# Patient Record
Sex: FEMALE | Race: WHITE | NOT HISPANIC OR LATINO | Employment: FULL TIME
[De-identification: names, ages, dates, MRNs, and addresses within clinical notes are randomized per-mention and may not be internally consistent; named-entity substitution may affect disease eponyms.]

---

## 2017-07-15 ENCOUNTER — HEALTH MAINTENANCE LETTER (OUTPATIENT)
Age: 22
End: 2017-07-15

## 2018-07-29 ENCOUNTER — HEALTH MAINTENANCE LETTER (OUTPATIENT)
Age: 23
End: 2018-07-29

## 2022-01-08 ENCOUNTER — HEALTH MAINTENANCE LETTER (OUTPATIENT)
Age: 27
End: 2022-01-08

## 2022-02-27 ENCOUNTER — HEALTH MAINTENANCE LETTER (OUTPATIENT)
Age: 27
End: 2022-02-27

## 2022-06-03 ENCOUNTER — MEDICAL CORRESPONDENCE (OUTPATIENT)
Dept: HEALTH INFORMATION MANAGEMENT | Facility: CLINIC | Age: 27
End: 2022-06-03
Payer: COMMERCIAL

## 2022-06-16 ENCOUNTER — HOSPITAL ENCOUNTER (EMERGENCY)
Facility: CLINIC | Age: 27
Discharge: HOME | End: 2022-06-17
Attending: EMERGENCY MEDICINE | Admitting: EMERGENCY MEDICINE
Payer: COMMERCIAL

## 2022-06-16 DIAGNOSIS — M54.42 ACUTE LEFT-SIDED LOW BACK PAIN WITH LEFT-SIDED SCIATICA: ICD-10-CM

## 2022-06-16 PROCEDURE — 99284 EMERGENCY DEPT VISIT MOD MDM: CPT | Mod: 25 | Performed by: EMERGENCY MEDICINE

## 2022-06-16 PROCEDURE — 20552 NJX 1/MLT TRIGGER POINT 1/2: CPT | Mod: LT | Performed by: EMERGENCY MEDICINE

## 2022-06-16 PROCEDURE — 81001 URINALYSIS AUTO W/SCOPE: CPT | Performed by: EMERGENCY MEDICINE

## 2022-06-16 PROCEDURE — 81025 URINE PREGNANCY TEST: CPT | Performed by: EMERGENCY MEDICINE

## 2022-06-16 NOTE — Clinical Note
Telma Sandoval was seen and treated in our emergency department on 6/16/2022.  She may return to work on 06/20/2022.       If you have any questions or concerns, please don't hesitate to call.      Brandie Lucas, RN

## 2022-06-17 VITALS
TEMPERATURE: 98.5 F | RESPIRATION RATE: 18 BRPM | HEART RATE: 98 BPM | WEIGHT: 220 LBS | OXYGEN SATURATION: 99 % | DIASTOLIC BLOOD PRESSURE: 83 MMHG | SYSTOLIC BLOOD PRESSURE: 132 MMHG

## 2022-06-17 LAB
ALBUMIN UR-MCNC: NEGATIVE MG/DL
APPEARANCE UR: CLEAR
BACTERIA #/AREA URNS HPF: ABNORMAL /HPF
BILIRUB UR QL STRIP: NEGATIVE
COLOR UR AUTO: YELLOW
GLUCOSE UR STRIP-MCNC: NEGATIVE MG/DL
HCG UR QL: NEGATIVE
HGB UR QL STRIP: NEGATIVE
KETONES UR STRIP-MCNC: NEGATIVE MG/DL
LEUKOCYTE ESTERASE UR QL STRIP: NEGATIVE
MUCOUS THREADS #/AREA URNS LPF: PRESENT /LPF
NITRATE UR QL: NEGATIVE
PH UR STRIP: 5 [PH] (ref 5–7)
RBC URINE: 1 /HPF
SP GR UR STRIP: 1.02 (ref 1–1.03)
SQUAMOUS EPITHELIAL: 2 /HPF
UROBILINOGEN UR STRIP-MCNC: NORMAL MG/DL
WBC URINE: 1 /HPF

## 2022-06-17 PROCEDURE — 250N000013 HC RX MED GY IP 250 OP 250 PS 637: Performed by: EMERGENCY MEDICINE

## 2022-06-17 PROCEDURE — 250N000011 HC RX IP 250 OP 636: Performed by: EMERGENCY MEDICINE

## 2022-06-17 RX ORDER — OXYCODONE HYDROCHLORIDE 5 MG/1
5 TABLET ORAL EVERY 6 HOURS PRN
Qty: 6 TABLET | Refills: 0 | Status: SHIPPED | OUTPATIENT
Start: 2022-06-17 | End: 2022-09-18

## 2022-06-17 RX ORDER — ONDANSETRON 4 MG/1
4 TABLET, ORALLY DISINTEGRATING ORAL ONCE
Status: COMPLETED | OUTPATIENT
Start: 2022-06-17 | End: 2022-06-17

## 2022-06-17 RX ORDER — OXYCODONE HYDROCHLORIDE 5 MG/1
5 TABLET ORAL EVERY 6 HOURS PRN
Qty: 10 TABLET | Refills: 0 | Status: SHIPPED | OUTPATIENT
Start: 2022-06-17 | End: 2022-06-17

## 2022-06-17 RX ORDER — OXYCODONE HYDROCHLORIDE 5 MG/1
10 TABLET ORAL ONCE
Status: COMPLETED | OUTPATIENT
Start: 2022-06-17 | End: 2022-06-17

## 2022-06-17 RX ADMIN — ONDANSETRON 4 MG: 4 TABLET, ORALLY DISINTEGRATING ORAL at 01:03

## 2022-06-17 RX ADMIN — OXYCODONE HYDROCHLORIDE 10 MG: 5 TABLET ORAL at 01:10

## 2022-06-17 RX ADMIN — IBUPROFEN 600 MG: 400 TABLET ORAL at 01:12

## 2022-06-17 NOTE — DISCHARGE INSTRUCTIONS
Return to the emergency department for fever, weakness in the legs, numbness or tingling of the vagina or anus, difficulty urinating, or other concerns.  Follow-up with your physical therapist and with your primary doctor.    Use ibuprofen and acetaminophen for your symptoms.  Use oxycodone as needed for pain that is not controlled by the prior two medications.    Oxycodone is an addictive opioid medication, please only take it when the pain is more than can be controlled by acetaminophen or ibuprofen alone. It will also make you lightheaded, nauseated, and constipated.  Do not drive, operate heavy machinery, or take care of young children while taking this medication. Do not mix it with other medications or drugs that will make you sleepy, such as alcohol.     Repeated studies have shown that the longer you use opioid pain medications, the longer it is until you return to normal function. It is our recommendation that you taper off opioids as quickly as possible with the goal of returning to normal function or near normal function. Long term use of opioids quickly results in growing tolerance to the medication (less of the benefits) and increased dependence (more of the bad side effects).     Pain is very difficult to treat and we can very rarely take away pain completely. If you are having difficulty with pain over several weeks after an injury, you may need to start different medications and therapies (such as physical therapy, graded exercise, massage, and acupuncture). Please talk about this with your regular doctor.     If you are interested in seeking free, confidential treatment referral and information service for individuals and families facing mental health and/or substance use disorders please call [phone] ([phone])

## 2022-06-17 NOTE — ED TRIAGE NOTES
Pt here with pain in L lower back that radiates to the L leg and started about 2 months ago. Pt see's an Maggie [facility] and is set up for physical therapy but has not started yet. Pt states that she was sitting on the floor tonight and went to get up and the back locked up on her and was on the floor for 2 hours before she could get up tonight.     Triage Assessment     Row Name 06/16/22 1783       Triage Assessment (Adult)    Airway WDL WDL       Respiratory WDL    Respiratory WDL WDL       Skin Circulation/Temperature WDL    Skin Circulation/Temperature WDL WDL       Cardiac WDL    Cardiac WDL WDL       Peripheral/Neurovascular WDL    Peripheral Neurovascular WDL WDL       Cognitive/Neuro/Behavioral WDL    Cognitive/Neuro/Behavioral WDL WDL

## 2022-06-17 NOTE — ED PROVIDER NOTES
History     Chief Complaint   Patient presents with     Back Pain     HPI  Telma Sandoval is a 27 year old female who presents for back pain.  Pain started several months ago but worse today after she was sitting on the ground.  Pain localized to left lower back, with radiation to left thigh.  Described as cramping and tightness, rated as severe.  She does not have a history of trauma.  Has taken acetaminophen and Flexeril for this pain.  She does have a hx of chronic back pain; has a first appointment with physical therapy next week.  She does not have bowel/bladder dysfunction, history of malignancy, history of IVDU, history of immunosuppression, or fever. Denies numbness or tingling of the perineum. She does not have headache, chest pain, shortness of breath, abdominal pain, vomiting, diarrhea, or extremity weakness or paresthesias.      Allergies:  Allergies   Allergen Reactions     Augmentin Nausea and Vomiting and Diarrhea       Problem List:    There are no problems to display for this patient.       Past Medical History:    History reviewed. No pertinent past medical history.    Past Surgical History:    History reviewed. No pertinent surgical history.    Family History:    History reviewed. No pertinent family history.    Social History:  Marital Status:  Single [1]  Social History     Tobacco Use     Smoking status: Never Smoker     Smokeless tobacco: Never Used   Substance Use Topics     Alcohol use: Yes     Comment: Occasional     Drug use: Yes     Types: Marijuana        Medications:    oxyCODONE (ROXICODONE) 5 MG tablet  Cetirizine HCl (ZYRTEC PO)  famotidine (PEPCID) 10 MG tablet          Review of Systems  Pertinent positives and negatives listed in the HPI, all other systems reviewed and are negative.    Physical Exam   BP: (!) 164/108  Pulse: 105  Temp: 98.5  F (36.9  C)  Resp: 18  Weight: 99.8 kg (220 lb)  SpO2: 97 %      Physical Exam  Constitutional:       General: She is not in acute  distress.     Appearance: She is well-developed. She is not diaphoretic.   HENT:      Head: Normocephalic and atraumatic.   Eyes:      General: No scleral icterus.  Musculoskeletal:      Cervical back: Normal range of motion and neck supple.      Comments: Back: no deformity, erythema, warmth, or masses appreciated; no tenderness over midline, R and L paraspinal muscles, sacroiliac region; normal spine ROM; spine symmetric with normal curvature; normal ROM of hips and knees; intact LE sensation to light touch; normal LE strength   Skin:     General: Skin is warm and dry.      Coloration: Skin is not pale.      Findings: No erythema or rash.   Neurological:      Mental Status: She is alert and oriented to person, place, and time.      Comments: Left lower extremity: 5/5 strength with hip flexion, hip extension, knee flexion, knee extension, dorsiflexion, and plantar flexion  Right lower extremity: 5/5 strength with hip flexion, hip extension, knee flexion, knee extension, dorsiflexion, and plantar flexion          ED Course                 Procedures  Trigger point injection  Location: left lower back pain  Indication: pain  Date: 6/17/2022   The patient gave verbal consent for the procedure. Risks and benefits were discussed.  Pre-procedure exam: Motor and sensory exam normal prior to injection. Normal perfusion.  Procedure: A 27 gauge needle was inserted under normal sterile procedure. Approximately 4 mL of 0.5% bupivacaine was injected. The patient tolerated the procedure well without immediate complication. Pain was improved.             Critical Care time:  none               Results for orders placed or performed during the hospital encounter of 06/16/22 (from the past 24 hour(s))   UA reflex to Microscopic   Result Value Ref Range    Color Urine Yellow Colorless, Straw, Light Yellow, Yellow    Appearance Urine Clear Clear    Glucose Urine Negative Negative mg/dL    Bilirubin Urine Negative Negative    Ketones  Urine Negative Negative mg/dL    Specific Gravity Urine 1.020 1.003 - 1.035    Blood Urine Negative Negative    pH Urine 5.0 5.0 - 7.0    Protein Albumin Urine Negative Negative mg/dL    Urobilinogen Urine Normal Normal, 2.0 mg/dL    Nitrite Urine Negative Negative    Leukocyte Esterase Urine Negative Negative    Bacteria Urine Few (A) None Seen /HPF    RBC Urine 1 <=2 /HPF    WBC Urine 1 <=5 /HPF    Squamous Epithelials Urine 2 (H) <=1 /HPF    Mucus Urine Present (A) None Seen /LPF    Narrative    Microscopic not indicated   HCG qualitative urine   Result Value Ref Range    hCG Urine Qualitative Negative Negative       Medications   ibuprofen (ADVIL/MOTRIN) tablet 600 mg (600 mg Oral Given 6/17/22 0112)   oxyCODONE (ROXICODONE) tablet 10 mg (10 mg Oral Given 6/17/22 0110)   ondansetron (ZOFRAN ODT) ODT tab 4 mg (4 mg Oral Given 6/17/22 0103)       Assessments & Plan (with Medical Decision Making)   27 year old female who presents with back pain. Patient vitally stable.  Differential includes muscle strain vs spasm, DJD, disk herniation, spinal stenosis, vertebral fracture.  Pt does not have historical features or exam findings to suggest more serious back pathology such as metastatic disease, tuberculosis, epidural abscess, or cauda equina/conus medullaris.  No indication for imaging at this time.  Urine pregnancy test negative, no signs of ectopic pregnancy.  Trigger point injection performed as above and she is given oxycodone and ibuprofen for the pain.  At this time she is safe to discharge with a short course of oxycodone and instructions to return if she has worsening of her symptoms, fevers, weakness in legs, or other concerns.  Otherwise follow-up in clinic.  The patient is in agreement with this plan.    I have reviewed the nursing notes.    I have reviewed the findings, diagnosis, plan and need for follow up with the patient.       New Prescriptions    OXYCODONE (ROXICODONE) 5 MG TABLET    Take 1 tablet  (5 mg) by mouth every 6 hours as needed for severe pain       Final diagnoses:   Acute left-sided low back pain with left-sided sciatica       6/16/2022   [facility] EMERGENCY DEPT     Arturo Ruth MD  06/17/22 0121

## 2022-06-19 ENCOUNTER — HEALTH MAINTENANCE LETTER (OUTPATIENT)
Age: 27
End: 2022-06-19

## 2022-06-21 ENCOUNTER — HOSPITAL ENCOUNTER (OUTPATIENT)
Dept: PHYSICAL THERAPY | Facility: CLINIC | Age: 27
Setting detail: THERAPIES SERIES
Discharge: HOME | End: 2022-06-21
Payer: COMMERCIAL

## 2022-06-21 PROCEDURE — 97110 THERAPEUTIC EXERCISES: CPT | Mod: GP | Performed by: PHYSICAL MEDICINE & REHABILITATION

## 2022-06-21 PROCEDURE — 97161 PT EVAL LOW COMPLEX 20 MIN: CPT | Mod: GP | Performed by: PHYSICAL MEDICINE & REHABILITATION
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
## 2022-06-28 ENCOUNTER — HOSPITAL ENCOUNTER (OUTPATIENT)
Dept: PHYSICAL THERAPY | Facility: CLINIC | Age: 27
Setting detail: THERAPIES SERIES
Discharge: HOME | End: 2022-06-28
Payer: COMMERCIAL

## 2022-06-28 PROCEDURE — 97110 THERAPEUTIC EXERCISES: CPT | Mod: GP | Performed by: PHYSICAL MEDICINE & REHABILITATION

## 2022-07-07 ENCOUNTER — HOSPITAL ENCOUNTER (OUTPATIENT)
Dept: PHYSICAL THERAPY | Facility: CLINIC | Age: 27
Setting detail: THERAPIES SERIES
Discharge: HOME | End: 2022-07-07
Attending: PSYCHIATRY & NEUROLOGY
Payer: COMMERCIAL

## 2022-07-07 PROCEDURE — 97110 THERAPEUTIC EXERCISES: CPT | Mod: GP | Performed by: PHYSICAL MEDICINE & REHABILITATION

## 2022-07-29 ENCOUNTER — HOSPITAL ENCOUNTER (OUTPATIENT)
Dept: PHYSICAL THERAPY | Facility: CLINIC | Age: 27
Setting detail: THERAPIES SERIES
Discharge: HOME | End: 2022-07-29
Attending: PSYCHIATRY & NEUROLOGY
Payer: COMMERCIAL

## 2022-07-29 PROCEDURE — 97110 THERAPEUTIC EXERCISES: CPT | Mod: GP | Performed by: PHYSICAL MEDICINE & REHABILITATION

## 2022-08-02 ENCOUNTER — HOSPITAL ENCOUNTER (OUTPATIENT)
Dept: PHYSICAL THERAPY | Facility: CLINIC | Age: 27
Setting detail: THERAPIES SERIES
Discharge: HOME | End: 2022-08-02
Attending: PSYCHIATRY & NEUROLOGY
Payer: COMMERCIAL

## 2022-08-02 PROCEDURE — 97012 MECHANICAL TRACTION THERAPY: CPT | Mod: GP | Performed by: PHYSICAL MEDICINE & REHABILITATION

## 2022-08-02 PROCEDURE — 97110 THERAPEUTIC EXERCISES: CPT | Mod: GP | Performed by: PHYSICAL MEDICINE & REHABILITATION

## 2022-08-11 ENCOUNTER — OFFICE VISIT (OUTPATIENT)
Dept: URGENT CARE | Facility: URGENT CARE | Age: 27
End: 2022-08-11
Payer: COMMERCIAL

## 2022-08-11 VITALS
SYSTOLIC BLOOD PRESSURE: 136 MMHG | WEIGHT: 220 LBS | HEART RATE: 91 BPM | DIASTOLIC BLOOD PRESSURE: 86 MMHG | OXYGEN SATURATION: 97 % | TEMPERATURE: 99.3 F

## 2022-08-11 DIAGNOSIS — U07.1 INFECTION DUE TO 2019 NOVEL CORONAVIRUS: Primary | ICD-10-CM

## 2022-08-11 PROCEDURE — 99203 OFFICE O/P NEW LOW 30 MIN: CPT | Mod: CS | Performed by: FAMILY MEDICINE

## 2022-08-11 RX ORDER — ALBUTEROL SULFATE 90 UG/1
AEROSOL, METERED RESPIRATORY (INHALATION)
COMMUNITY
Start: 2022-08-10

## 2022-08-11 RX ORDER — TIZANIDINE HYDROCHLORIDE 2 MG/1
2 CAPSULE, GELATIN COATED ORAL 3 TIMES DAILY
COMMUNITY

## 2022-08-11 RX ORDER — PROPRANOLOL HYDROCHLORIDE 20 MG/1
20 TABLET ORAL 3 TIMES DAILY
COMMUNITY

## 2022-08-11 RX ORDER — LANOLIN ALCOHOL/MO/W.PET/CERES
1000 CREAM (GRAM) TOPICAL DAILY
COMMUNITY

## 2022-08-11 RX ORDER — GABAPENTIN 400 MG/1
200 CAPSULE ORAL 2 TIMES DAILY
COMMUNITY

## 2022-08-11 RX ORDER — CYCLOBENZAPRINE HCL 10 MG
TABLET ORAL
COMMUNITY
Start: 2022-05-06

## 2022-08-11 RX ORDER — BENZONATATE 100 MG/1
100 CAPSULE ORAL 3 TIMES DAILY PRN
Qty: 30 CAPSULE | Refills: 1 | Status: SHIPPED | OUTPATIENT
Start: 2022-08-11

## 2022-08-11 NOTE — PROGRESS NOTES
SUBJECTIVE:  Telma Sandoval is a 27 year old female who presents to the clinic today with a chief complaint of cough  and pleuritic chest pain for 1 day(s).  Her cough is described as occasional.    The patient's symptoms are mild and not changing over the course of time.  Associated symptoms include chest pain. The patient's symptoms are exacerbated by no particular triggers  Patient has been using albuterol nebs  to improve symptoms.    No past medical history on file.    Current Outpatient Medications   Medication Sig Dispense Refill     Cetirizine HCl (ZYRTEC PO) Take  by mouth.       cyanocobalamin (VITAMIN B-12) 1000 MCG tablet Take 1,000 mcg by mouth daily       cyclobenzaprine (FLEXERIL) 10 MG tablet TAKE 1 TABLET BY MOUTH AT BEDTIME AS NEEDED       famotidine (PEPCID) 10 MG tablet Take 10 mg by mouth 2 times daily.       gabapentin (NEURONTIN) 400 MG capsule Take 200 mg by mouth 2 times daily       propranolol (INDERAL) 20 MG tablet Take 20 mg by mouth 3 times daily       sertraline (ZOLOFT) 50 MG tablet Take 75 mg by mouth daily       tiZANidine (ZANAFLEX) 2 MG capsule Take 2 mg by mouth 3 times daily       VENTOLIN  (90 Base) MCG/ACT inhaler        oxyCODONE (ROXICODONE) 5 MG tablet Take 1 tablet (5 mg) by mouth every 6 hours as needed for severe pain (Patient not taking: Reported on 8/11/2022) 6 tablet 0       Social History     Tobacco Use     Smoking status: Never Smoker     Smokeless tobacco: Never Used   Substance Use Topics     Alcohol use: Yes     Comment: Occasional       ROS  Review of systems negative except as stated above.    OBJECTIVE:  /86   Pulse 91   Temp 99.3  F (37.4  C) (Tympanic)   Wt 99.8 kg (220 lb)   SpO2 97%   GENERAL APPEARANCE: healthy, alert and no distress  EYES: EOMI,  PERRL, conjunctiva clear  HENT: ear canals and TM's normal.  Nose and mouth without ulcers, erythema or lesions  NECK: supple, nontender, no lymphadenopathy  RESP: lungs clear to  "auscultation - no rales, rhonchi or wheezes  CV: regular rates and rhythm, normal S1 S2, no murmur noted  SKIN: no suspicious lesions or rashes    ASSESSMENT:    1. Infection due to 2019 novel coronavirus  Improving   - benzonatate (TESSALON) 100 MG capsule; Take 1 capsule (100 mg) by mouth 3 times daily as needed for cough  Dispense: 30 capsule; Refill: 1    PLAN: symptomatic treatment   See orders in Epic  Symptomatic measures encouraged, humidified air, plenty of fluids.        Najma Hollis is a 27 year old, presenting for the following health issues:  Cough (2 days ago, throat pain, cough, fever, positive covid at home test.  Difficulty breathing, used inhaler, didn't help, spoke with \"doctor on demand\" and was told to go to .)      HPI       Bridgett Patterson M.D.                  .  ..      "

## 2022-08-11 NOTE — PATIENT INSTRUCTIONS
Cough may last a few weeks longer than other symptoms. As long as there is improvement over time this is normal as everything heals up from the original virus.

## 2022-08-24 ENCOUNTER — HOSPITAL ENCOUNTER (OUTPATIENT)
Dept: PHYSICAL THERAPY | Facility: CLINIC | Age: 27
Setting detail: THERAPIES SERIES
Discharge: HOME | End: 2022-08-24
Attending: PSYCHIATRY & NEUROLOGY
Payer: COMMERCIAL

## 2022-08-24 PROCEDURE — 97012 MECHANICAL TRACTION THERAPY: CPT | Mod: GP | Performed by: PHYSICAL MEDICINE & REHABILITATION

## 2022-08-24 PROCEDURE — 97110 THERAPEUTIC EXERCISES: CPT | Mod: GP | Performed by: PHYSICAL MEDICINE & REHABILITATION

## 2022-08-26 NOTE — PROGRESS NOTES
[facility]    Outpatient Physical Therapy Progress Note  Patient: Telma Sandoval  : 1995    Beginning/End Dates of Reporting Period:  22 to 22    Referring Provider: Jessie Flores MD    Therapy Diagnosis: chronic neck pain and LBP     Client Self Report: Notes she went to zoo [place], was so sore  could not get out of bed. Notes walking and stopping while walking increases sx in neck and LBP. Reports while doing exercises felt good in the moment but then pain resumed with trying to keep upright posture.    Objective Measurements:  Objective Measure: cervical ROM  Details: flexion: chin to chest, extension: WNL (notes increased pain between scap), SB R: 40* (pain over L upper trap), SB L: 40* (pain over L upper trap), Rotation L: 61*, Rotation R: 59*  Objective Measure: Lumbar ROM    Outcome Measures (most recent score):  Not yet reassessed since eval    Goals:  Goal Identifier 1   Goal Description Pt will be able to bend over without increase in sx.   Target Date 22   Date Met      Progress (detail required for progress note): still notes sx at this time     Goal Identifier 2   Goal Description Pt will be able to sit with 50% less radicular sx in order to decrease difficulty with work.   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   Target Date 22   Date Met      Progress (detail required for progress note):       Goal Identifier 4   Goal Description Pt will be independent with HEP in order to self manage symptoms.   Target Date 22   Date Met      Progress (detail required for progress note): I with current HEP, LTG     Progress towards goals:  Pt has attended 5 PT sessions and has achieved 0/4 short term and long term goals. Pt's progress has been slow due to multiple body segment involvement as well as chronicity of sx. Pt is appropriate for continued skilled PT to address residual deficits and impairments in order to decrease difficulty with work, bending and walking.    Plan:  Continue therapy per current plan of care, date extension 6 weeks. Goal dates changed to reflect date extension with remaining goals not met.    Discharge:  No    Please contact me with any questions or concerns.    Thank you for your referral,     Ashly Conway, PT, DPT  Physical Therapist  [place] [id number] [place] [address] [phone]

## 2022-08-31 ENCOUNTER — HOSPITAL ENCOUNTER (OUTPATIENT)
Dept: PHYSICAL THERAPY | Facility: CLINIC | Age: 27
Setting detail: THERAPIES SERIES
Discharge: HOME | End: 2022-08-31
Attending: PSYCHIATRY & NEUROLOGY
Payer: COMMERCIAL

## 2022-08-31 PROCEDURE — 97110 THERAPEUTIC EXERCISES: CPT | Mod: GP | Performed by: PHYSICAL MEDICINE & REHABILITATION

## 2022-08-31 NOTE — PROGRESS NOTES
[facility]    Outpatient Physical Therapy Discharge Note  Patient: Telma Sandoval  : 1995    Beginning/End Dates of Reporting Period:  22 to 22    Referring Provider: Jessie Flores MD    Therapy Diagnosis: chronic neck pain and LBP     Client Self Report: Pt reports no changes in neck pain, no improvements. States back exercises improve pain for a couple hours but no long lasting results.    Objective Measurements:  Objective Measure: cervical ROM  Details: flexion: chin to chest, extension: WNL (notes increased pain between scap), SB R: 40* (pain over L upper trap), SB L: 40* (pain over L upper trap), Rotation L: 61*, Rotation R: 59*  Objective Measure: Lumbar ROM  Details: flexion: extension: SB R: SB L: rotation R: rotation L:     Outcome Measures (most recent score):  Pain (NPRS): Neck 5/10, Hip 4/10    Goals:  Goal Identifier 1   Goal Description Pt will be able to bend over without increase in sx.   Target Date 22   Date Met      Progress (detail required for progress note): notes it wasn't for awhile but now it is again     Goal Identifier 2   Goal Description Pt will be able to sit with 50% less radicular sx in order to decrease difficulty with work.   Target Date 22   Date Met      Progress (detail required for progress note): Reports had improved, last couple days sx returned and more frequent     Goal Identifier 3   Goal Description Pt will demonstrate 4+/5 B LE strength in order to decrease difficulty with amb.   Target Date 22   Date Met      Progress (detail required for progress note): Notes improvement but still present     Goal Identifier 4   Goal Description Pt will be independent with HEP in order to self manage symptoms.   Target Date 22   Date Met  22   Progress (detail required for progress note): I with current HEP     Progress towards  goals: Pt has attended 7 PT sessions over the last two months for neck and LBP. Pt has met 1/4 goals with good HEP compliance. Despite good HEP compliance, manual therapy, traction, stretching and strengtheing exercises, pt has felt little improvement in pain. PT and pt discussed and agreed upon return to referring provider for further pain management options at this time.    Plan:  Discharge from therapy.    Discharge:    Reason for Discharge: No further expectation of progress.    Equipment Issued: Loopster    Discharge Plan: Patient to continue home program.    Please contact me with any questions or concerns.    Thank you for your referral,     Ashly Conway, PT, DPT  Physical Therapist  [address] [address] [phone]

## 2022-09-18 ENCOUNTER — ANCILLARY PROCEDURE (OUTPATIENT)
Dept: GENERAL RADIOLOGY | Facility: CLINIC | Age: 27
End: 2022-09-18
Attending: FAMILY MEDICINE
Payer: COMMERCIAL

## 2022-09-18 ENCOUNTER — OFFICE VISIT (OUTPATIENT)
Dept: URGENT CARE | Facility: URGENT CARE | Age: 27
End: 2022-09-18

## 2022-09-18 VITALS
SYSTOLIC BLOOD PRESSURE: 138 MMHG | WEIGHT: 231 LBS | DIASTOLIC BLOOD PRESSURE: 88 MMHG | TEMPERATURE: 98.5 F | HEART RATE: 88 BPM | OXYGEN SATURATION: 98 %

## 2022-09-18 DIAGNOSIS — R05.9 COUGH: ICD-10-CM

## 2022-09-18 DIAGNOSIS — J30.2 SEASONAL ALLERGIES: ICD-10-CM

## 2022-09-18 DIAGNOSIS — J01.90 ACUTE SINUSITIS WITH SYMPTOMS > 10 DAYS: Primary | ICD-10-CM

## 2022-09-18 PROCEDURE — 71046 X-RAY EXAM CHEST 2 VIEWS: CPT | Mod: TC | Performed by: RADIOLOGY

## 2022-09-18 PROCEDURE — 99213 OFFICE O/P EST LOW 20 MIN: CPT | Performed by: FAMILY MEDICINE

## 2022-09-18 RX ORDER — FLUTICASONE PROPIONATE 50 MCG
1 SPRAY, SUSPENSION (ML) NASAL DAILY
Qty: 16 G | Refills: 0 | Status: SHIPPED | OUTPATIENT
Start: 2022-09-18

## 2022-09-18 RX ORDER — CEFIXIME 400 MG/1
400 CAPSULE ORAL DAILY
Qty: 7 CAPSULE | Refills: 0 | Status: SHIPPED | OUTPATIENT
Start: 2022-09-18 | End: 2022-09-25

## 2022-09-18 RX ORDER — PREDNISONE 20 MG/1
40 TABLET ORAL DAILY
Qty: 14 TABLET | Refills: 0 | Status: SHIPPED | OUTPATIENT
Start: 2022-09-18 | End: 2022-09-25

## 2022-09-18 NOTE — PROGRESS NOTES
4  Assessment & Plan     Acute sinusitis with symptoms > 10 days  Patient was diagnosed with acute sinusitis last week and started on doxycycline however symptoms persisting.  History of seasonal allergies.  Differential discussed in detail including seasonal allergies along with sinusitis.  Doxycycline switched to cefixime, prednisone and Flonase prescribed, common side effects discussed.  Stressed as well hydration, warm fluids, over-the-counter antihistamine and steam inhalation.  Follow-up with PCP in 3 to 5 days or earlier if needed.  Patient understood and in agreement with above plan.  All questions answered.  - cefixime (SUPRAX) 400 MG capsule; Take 1 capsule (400 mg) by mouth daily for 7 days    Cough  - XR Chest 2 Views; Future    Seasonal allergies  As above  - predniSONE (DELTASONE) 20 MG tablet; Take 2 tablets (40 mg) by mouth daily for 7 days  - fluticasone (FLONASE) 50 MCG/ACT nasal spray; Spray 1 spray into both nostrils daily      Sidney Marc MD  [facility]    Najma Hollis is a 27 year old presenting for the following health issues:  Sinus Problem and Cough (Not getting better, was told by pcp if not feeling better by the weekend come in to be seen.)      HPI     Concern -   Onset: 9 days  Description: cough, chest congestion, eyes itchy, runny nose and eyes ithcing   Intensity: moderate  Progression of Symptoms:  Not much changed  Accompanying Signs & Symptoms: no fever, chills, chest pain or other relevant systemic symptoms   Previous history of similar problem: seasonal allergies   Therapies tried and outcome: doxycyline, tessalon      Review of Systems   Constitutional, HEENT, cardiovascular, pulmonary, gi and gu systems are negative, except as otherwise noted.      Objective    /88   Pulse 88   Temp 98.5  F (36.9  C) (Tympanic)   Wt 104.8 kg (231 lb)   SpO2 98%   There is no height or weight on file to calculate BMI.  Physical Exam    GENERAL: alert and no distress  EYES: Eyes grossly normal to inspection, PERRL and conjunctivae and sclerae normal  HENT: normal cephalic/atraumatic, ear canals and TM's normal, nasal mucosa edematous , rhinorrhea clear, oral mucous membranes moist and sinuses: maxillary tenderness on left  NECK: no adenopathy, no asymmetry, masses, or scars and thyroid normal to palpation  RESP: lungs clear to auscultation - no rales, rhonchi or wheezes  CV: regular rates and rhythm, normal S1 S2, no S3 or S4 and no murmur, click or rub  MS: no gross musculoskeletal defects noted, no edema  SKIN: no suspicious lesions or rashes  NEURO: Normal strength and tone, mentation intact and speech normal  PSYCH: mentation appears normal, affect normal/bright    Results for orders placed or performed in visit on 09/18/22   XR Chest 2 Views     Status: None    Narrative    EXAM: XR CHEST 2 VIEWS  LOCATION: [facility]  DATE/TIME: 9/18/2022 10:37 AM    INDICATION:  Cough  COMPARISON: 01/07/2020      Impression    IMPRESSION: Negative chest.

## 2022-11-19 ENCOUNTER — HEALTH MAINTENANCE LETTER (OUTPATIENT)
Age: 27
End: 2022-11-19

## 2023-09-15 ENCOUNTER — OFFICE VISIT (OUTPATIENT)
Dept: URGENT CARE | Facility: URGENT CARE | Age: 28
End: 2023-09-15
Payer: COMMERCIAL

## 2023-09-15 VITALS
RESPIRATION RATE: 16 BRPM | WEIGHT: 232 LBS | TEMPERATURE: 99.3 F | SYSTOLIC BLOOD PRESSURE: 135 MMHG | OXYGEN SATURATION: 93 % | DIASTOLIC BLOOD PRESSURE: 98 MMHG | HEART RATE: 95 BPM

## 2023-09-15 DIAGNOSIS — J06.9 URI WITH COUGH AND CONGESTION: Primary | ICD-10-CM

## 2023-09-15 PROCEDURE — 99213 OFFICE O/P EST LOW 20 MIN: CPT | Performed by: FAMILY MEDICINE

## 2023-09-15 RX ORDER — ALBUTEROL SULFATE 90 UG/1
2 AEROSOL, METERED RESPIRATORY (INHALATION) EVERY 6 HOURS PRN
Qty: 18 G | Refills: 1 | Status: SHIPPED | OUTPATIENT
Start: 2023-09-15

## 2023-09-15 RX ORDER — BENZONATATE 100 MG/1
100 CAPSULE ORAL 3 TIMES DAILY PRN
Qty: 30 CAPSULE | Refills: 1 | Status: SHIPPED | OUTPATIENT
Start: 2023-09-15

## 2023-09-15 NOTE — PROGRESS NOTES
SUBJECTIVE:   Telma Sandoval is a 28 year old female presenting with a chief complaint of cough - non-productive and sore throat.  Onset of symptoms was 5 day(s) ago.  Course of illness is worsening.    Severity moderate  Current and Associated symptoms:   Treatment measures tried include Tylenol/Ibuprofen and OTC Cough med.  Predisposing factors include None.    History reviewed. No pertinent past medical history.  Current Outpatient Medications   Medication Sig Dispense Refill    Cetirizine HCl (ZYRTEC PO) Take  by mouth.      cyanocobalamin (VITAMIN B-12) 1000 MCG tablet Take 1,000 mcg by mouth daily      famotidine (PEPCID) 10 MG tablet Take 10 mg by mouth 2 times daily.      fluticasone (FLONASE) 50 MCG/ACT nasal spray Spray 1 spray into both nostrils daily 16 g 0    gabapentin (NEURONTIN) 400 MG capsule Take 200 mg by mouth 2 times daily      sertraline (ZOLOFT) 50 MG tablet Take 75 mg by mouth daily      tiZANidine (ZANAFLEX) 2 MG capsule Take 2 mg by mouth 3 times daily      benzonatate (TESSALON) 100 MG capsule Take 1 capsule (100 mg) by mouth 3 times daily as needed for cough (Patient not taking: Reported on 9/15/2023) 30 capsule 1    cyclobenzaprine (FLEXERIL) 10 MG tablet TAKE 1 TABLET BY MOUTH AT BEDTIME AS NEEDED (Patient not taking: Reported on 9/15/2023)      propranolol (INDERAL) 20 MG tablet Take 20 mg by mouth 3 times daily (Patient not taking: Reported on 9/15/2023)      VENTOLIN  (90 Base) MCG/ACT inhaler  (Patient not taking: Reported on 9/15/2023)       Social History     Tobacco Use    Smoking status: Never    Smokeless tobacco: Never   Substance Use Topics    Alcohol use: Yes     Comment: Occasional     BP Readings from Last 6 Encounters:   09/15/23 (!) 135/98   09/18/22 138/88   08/11/22 136/86   06/17/22 132/83   09/03/11 131/79       ROS:  Review of systems negative except as stated above.    OBJECTIVE:  BP (!) 135/98 (BP Location: Right arm, Patient Position: Sitting, Cuff  Size: Adult Regular)   Pulse 95   Temp 99.3  F (37.4  C) (Tympanic)   Resp 16   Wt 105.2 kg (232 lb)   SpO2 93%   GENERAL APPEARANCE: healthy, alert and no distress  EYES: EOMI,  PERRL, conjunctiva clear  HENT: ear canals and TM's normal.  Nose and mouth without ulcers, erythema or lesions  NECK: supple, nontender, no lymphadenopathy  RESP: lungs clear to auscultation - no rales, rhonchi or wheezes  CV: regular rates and rhythm, normal S1 S2, no murmur noted  NEURO: Normal strength and tone, sensory exam grossly normal,  normal speech and mentation  SKIN: no suspicious lesions or rashes    ASSESSMENT:  Viral upper respiratory illness    PLAN:    See orders in Epic  1. URI with cough and congestion    - benzonatate (TESSALON) 100 MG capsule; Take 1 capsule (100 mg) by mouth 3 times daily as needed for cough  Dispense: 30 capsule; Refill: 1  - albuterol (PROAIR HFA/PROVENTIL HFA/VENTOLIN HFA) 108 (90 Base) MCG/ACT inhaler; Inhale 2 puffs into the lungs every 6 hours as needed for shortness of breath, wheezing or cough  Dispense: 18 g; Refill: 1  Patient Instructions   Upper respiratory infections are usually caused by viruses and, sometimes certain bacteria.  Antibiotics don't help the vast majority of people recover any quicker even when caused by a bacteria.  The body will fight this infection but it needs to be treated well in order to help heal itself.  Rest as needed.  It is ok to reduce food intake if appetite is poor but it is quite important to maintain/increase fluid intake.    For cough, dextromethorphan/guaifenesin combinations help loosen secretions and suppress cough safely without significant risk of sedation. Often 2 puffs four times daily of an albuterol inhaler will help with bronchitis.  This is a prescription medicine.    For nasal congestion and sinus pressure, pseudoephedrine (Sudafed) or phenylephrine is often helpful but it can cause elevations in blood pressure and insomnia.  Short courses  of a nasal decongestant spray (Afrin or Neosinephrine) can be appropriate but their use should be restricted to 3 days due to the high risk of nasal addiction.    For pain and fevers, acetaminophen (Tylenol) is most appropriate.  Ibuprofen (Advil) or naproxen (Aleve) are useful too and last longer but they can cause elevation of blood pressure or stomach problems.    Antihistamines (Benadryl, Dimetapp, etc.) cause sedation, confusion, bowel and urinary abnormalities and are of little use for infectious causes of cough and nasal congestion.  Their use should be reserved for allergic symptoms.  Use the inhaler as needed   Bridgett Patterson M.D.

## 2023-09-15 NOTE — PATIENT INSTRUCTIONS
Upper respiratory infections are usually caused by viruses and, sometimes certain bacteria.  Antibiotics don't help the vast majority of people recover any quicker even when caused by a bacteria.  The body will fight this infection but it needs to be treated well in order to help heal itself.  Rest as needed.  It is ok to reduce food intake if appetite is poor but it is quite important to maintain/increase fluid intake.    For cough, dextromethorphan/guaifenesin combinations help loosen secretions and suppress cough safely without significant risk of sedation. Often 2 puffs four times daily of an albuterol inhaler will help with bronchitis.  This is a prescription medicine.    For nasal congestion and sinus pressure, pseudoephedrine (Sudafed) or phenylephrine is often helpful but it can cause elevations in blood pressure and insomnia.  Short courses of a nasal decongestant spray (Afrin or Neosinephrine) can be appropriate but their use should be restricted to 3 days due to the high risk of nasal addiction.    For pain and fevers, acetaminophen (Tylenol) is most appropriate.  Ibuprofen (Advil) or naproxen (Aleve) are useful too and last longer but they can cause elevation of blood pressure or stomach problems.    Antihistamines (Benadryl, Dimetapp, etc.) cause sedation, confusion, bowel and urinary abnormalities and are of little use for infectious causes of cough and nasal congestion.  Their use should be reserved for allergic symptoms.  Use the inhaler as needed

## 2024-03-08 ENCOUNTER — LAB REQUISITION (OUTPATIENT)
Dept: LAB | Facility: CLINIC | Age: 29
End: 2024-03-08

## 2024-03-08 DIAGNOSIS — Z12.4 ENCOUNTER FOR SCREENING FOR MALIGNANT NEOPLASM OF CERVIX: ICD-10-CM

## 2024-03-08 PROCEDURE — G0145 SCR C/V CYTO,THINLAYER,RESCR: HCPCS | Performed by: NURSE PRACTITIONER

## 2024-03-13 LAB
BKR LAB AP GYN ADEQUACY: NORMAL
BKR LAB AP GYN INTERPRETATION: NORMAL
BKR LAB AP HPV REFLEX: NORMAL
BKR LAB AP LMP: NORMAL
BKR LAB AP PREVIOUS ABNL DX: NORMAL
BKR LAB AP PREVIOUS ABNORMAL: NORMAL
PATH REPORT.COMMENTS IMP SPEC: NORMAL
PATH REPORT.COMMENTS IMP SPEC: NORMAL
PATH REPORT.RELEVANT HX SPEC: NORMAL

## 2024-06-16 ENCOUNTER — HEALTH MAINTENANCE LETTER (OUTPATIENT)
Age: 29
End: 2024-06-16

## 2025-01-17 ENCOUNTER — LAB REQUISITION (OUTPATIENT)
Dept: LAB | Facility: CLINIC | Age: 30
End: 2025-01-17

## 2025-01-17 DIAGNOSIS — R39.14 FEELING OF INCOMPLETE BLADDER EMPTYING: ICD-10-CM

## 2025-01-17 PROCEDURE — 87086 URINE CULTURE/COLONY COUNT: CPT | Performed by: NURSE PRACTITIONER

## 2025-01-19 LAB — BACTERIA UR CULT: NORMAL

## 2025-06-21 ENCOUNTER — HEALTH MAINTENANCE LETTER (OUTPATIENT)
Age: 30
End: 2025-06-21